# Patient Record
Sex: FEMALE | Race: BLACK OR AFRICAN AMERICAN | NOT HISPANIC OR LATINO | URBAN - METROPOLITAN AREA
[De-identification: names, ages, dates, MRNs, and addresses within clinical notes are randomized per-mention and may not be internally consistent; named-entity substitution may affect disease eponyms.]

---

## 2018-11-06 ENCOUNTER — RECORDS - HEALTHEAST (OUTPATIENT)
Dept: LAB | Facility: CLINIC | Age: 28
End: 2018-11-06

## 2018-11-06 LAB
C REACTIVE PROTEIN LHE: 1.1 MG/DL (ref 0–0.8)
ERYTHROCYTE [DISTWIDTH] IN BLOOD BY AUTOMATED COUNT: 14 % (ref 11–14.5)
ERYTHROCYTE [SEDIMENTATION RATE] IN BLOOD BY WESTERGREN METHOD: 12 MM/HR (ref 0–20)
HCT VFR BLD AUTO: 39.3 % (ref 35–47)
HGB BLD-MCNC: 12.3 G/DL (ref 12–16)
MCH RBC QN AUTO: 25.5 PG (ref 27–34)
MCHC RBC AUTO-ENTMCNC: 31.3 G/DL (ref 32–36)
MCV RBC AUTO: 81 FL (ref 80–100)
PLATELET # BLD AUTO: 313 THOU/UL (ref 140–440)
PMV BLD AUTO: 10.7 FL (ref 8.5–12.5)
RBC # BLD AUTO: 4.83 MILL/UL (ref 3.8–5.4)
WBC: 6 THOU/UL (ref 4–11)

## 2018-11-07 ENCOUNTER — HOSPITAL ENCOUNTER (OUTPATIENT)
Dept: ULTRASOUND IMAGING | Facility: CLINIC | Age: 28
Discharge: HOME | End: 2018-11-07

## 2018-11-07 ENCOUNTER — OFFICE VISIT - HEALTHEAST (OUTPATIENT)
Dept: FAMILY MEDICINE | Facility: CLINIC | Age: 28
End: 2018-11-07

## 2018-11-07 DIAGNOSIS — M79.89 LEFT LEG SWELLING: ICD-10-CM

## 2018-11-07 DIAGNOSIS — L03.116 CELLULITIS OF LEFT ANKLE: ICD-10-CM

## 2018-11-14 ENCOUNTER — RECORDS - HEALTHEAST (OUTPATIENT)
Dept: ADMINISTRATIVE | Facility: OTHER | Age: 28
End: 2018-11-14

## 2018-11-29 ENCOUNTER — HOSPITAL ENCOUNTER (OUTPATIENT)
Dept: MRI IMAGING | Facility: CLINIC | Age: 28
Discharge: HOME | End: 2018-11-29
Attending: PHYSICIAN ASSISTANT

## 2018-11-29 DIAGNOSIS — M25.579 ANKLE PAIN: ICD-10-CM

## 2018-12-03 ENCOUNTER — HOSPITAL ENCOUNTER (OUTPATIENT)
Dept: MRI IMAGING | Facility: CLINIC | Age: 28
Discharge: HOME | End: 2018-12-03
Attending: PHYSICIAN ASSISTANT

## 2018-12-03 DIAGNOSIS — M25.579 ANKLE PAIN: ICD-10-CM

## 2018-12-12 ENCOUNTER — OFFICE VISIT - HEALTHEAST (OUTPATIENT)
Dept: FAMILY MEDICINE | Facility: CLINIC | Age: 28
End: 2018-12-12

## 2018-12-12 DIAGNOSIS — R23.9 UNSPECIFIED SKIN CHANGES: ICD-10-CM

## 2018-12-12 DIAGNOSIS — Z83.3 FAMILY HISTORY OF DIABETES MELLITUS: ICD-10-CM

## 2018-12-12 LAB
ALBUMIN SERPL-MCNC: 3.9 G/DL (ref 3.5–5)
ALBUMIN UR-MCNC: NEGATIVE MG/DL
ALP SERPL-CCNC: 86 U/L (ref 45–120)
ALT SERPL W P-5'-P-CCNC: 20 U/L (ref 0–45)
ANION GAP SERPL CALCULATED.3IONS-SCNC: 9 MMOL/L (ref 5–18)
APPEARANCE UR: CLEAR
AST SERPL W P-5'-P-CCNC: 20 U/L (ref 0–40)
BACTERIA #/AREA URNS HPF: ABNORMAL HPF
BASOPHILS # BLD AUTO: 0 THOU/UL (ref 0–0.2)
BASOPHILS NFR BLD AUTO: 0 % (ref 0–2)
BILIRUB SERPL-MCNC: 0.5 MG/DL (ref 0–1)
BILIRUB UR QL STRIP: NEGATIVE
BUN SERPL-MCNC: 9 MG/DL (ref 8–22)
CALCIUM SERPL-MCNC: 9.1 MG/DL (ref 8.5–10.5)
CHLORIDE BLD-SCNC: 106 MMOL/L (ref 98–107)
CO2 SERPL-SCNC: 24 MMOL/L (ref 22–31)
COLOR UR AUTO: YELLOW
CREAT SERPL-MCNC: 0.68 MG/DL (ref 0.6–1.1)
EOSINOPHIL # BLD AUTO: 0.1 THOU/UL (ref 0–0.4)
EOSINOPHIL NFR BLD AUTO: 2 % (ref 0–6)
ERYTHROCYTE [DISTWIDTH] IN BLOOD BY AUTOMATED COUNT: 13 % (ref 11–14.5)
GFR SERPL CREATININE-BSD FRML MDRD: >60 ML/MIN/1.73M2
GLUCOSE BLD-MCNC: 95 MG/DL (ref 70–125)
GLUCOSE UR STRIP-MCNC: NEGATIVE MG/DL
HBA1C MFR BLD: 5.6 % (ref 3.5–6)
HCT VFR BLD AUTO: 36.8 % (ref 35–47)
HGB BLD-MCNC: 12 G/DL (ref 12–16)
HGB UR QL STRIP: NEGATIVE
KETONES UR STRIP-MCNC: NEGATIVE MG/DL
LEUKOCYTE ESTERASE UR QL STRIP: NEGATIVE
LYMPHOCYTES # BLD AUTO: 2.5 THOU/UL (ref 0.8–4.4)
LYMPHOCYTES NFR BLD AUTO: 46 % (ref 20–40)
MCH RBC QN AUTO: 25.3 PG (ref 27–34)
MCHC RBC AUTO-ENTMCNC: 32.5 G/DL (ref 32–36)
MCV RBC AUTO: 78 FL (ref 80–100)
MONOCYTES # BLD AUTO: 0.4 THOU/UL (ref 0–0.9)
MONOCYTES NFR BLD AUTO: 7 % (ref 2–10)
NEUTROPHILS # BLD AUTO: 2.4 THOU/UL (ref 2–7.7)
NEUTROPHILS NFR BLD AUTO: 45 % (ref 50–70)
NITRATE UR QL: NEGATIVE
PH UR STRIP: 6 [PH] (ref 5–8)
PLATELET # BLD AUTO: 280 THOU/UL (ref 140–440)
PMV BLD AUTO: 8.3 FL (ref 7–10)
POTASSIUM BLD-SCNC: 3.6 MMOL/L (ref 3.5–5)
PROT SERPL-MCNC: 7.6 G/DL (ref 6–8)
RBC # BLD AUTO: 4.74 MILL/UL (ref 3.8–5.4)
RBC #/AREA URNS AUTO: ABNORMAL HPF
SODIUM SERPL-SCNC: 139 MMOL/L (ref 136–145)
SP GR UR STRIP: >=1.03 (ref 1–1.03)
SQUAMOUS #/AREA URNS AUTO: ABNORMAL LPF
UROBILINOGEN UR STRIP-ACNC: ABNORMAL
WBC #/AREA URNS AUTO: ABNORMAL HPF
WBC: 5.3 THOU/UL (ref 4–11)

## 2018-12-12 RX ORDER — DESOXIMETASONE 0.5 MG/G
CREAM TOPICAL
Qty: 30 G | Refills: 2 | Status: SHIPPED | OUTPATIENT
Start: 2018-12-12

## 2019-01-26 ENCOUNTER — HOSPITAL ENCOUNTER (OUTPATIENT)
Dept: ULTRASOUND IMAGING | Facility: HOSPITAL | Age: 29
Discharge: HOME | End: 2019-01-26

## 2019-01-26 ENCOUNTER — OFFICE VISIT - HEALTHEAST (OUTPATIENT)
Dept: FAMILY MEDICINE | Facility: CLINIC | Age: 29
End: 2019-01-26

## 2019-01-26 DIAGNOSIS — R10.2 PELVIC PAIN IN FEMALE: ICD-10-CM

## 2019-01-26 DIAGNOSIS — M54.50 ACUTE MIDLINE LOW BACK PAIN WITHOUT SCIATICA: ICD-10-CM

## 2019-01-26 RX ORDER — CYCLOBENZAPRINE HCL 10 MG
10 TABLET ORAL 3 TIMES DAILY PRN
Qty: 30 TABLET | Refills: 0 | Status: SHIPPED | OUTPATIENT
Start: 2019-01-26

## 2019-02-15 ENCOUNTER — COMMUNICATION - HEALTHEAST (OUTPATIENT)
Dept: ADMINISTRATIVE | Facility: CLINIC | Age: 29
End: 2019-02-15

## 2019-02-15 ENCOUNTER — AMBULATORY - HEALTHEAST (OUTPATIENT)
Dept: FAMILY MEDICINE | Facility: CLINIC | Age: 29
End: 2019-02-15

## 2019-02-15 DIAGNOSIS — E28.2 PCOS (POLYCYSTIC OVARIAN SYNDROME): ICD-10-CM

## 2019-02-22 ENCOUNTER — OFFICE VISIT - HEALTHEAST (OUTPATIENT)
Dept: ENDOCRINOLOGY | Facility: CLINIC | Age: 29
End: 2019-02-22

## 2019-02-22 ENCOUNTER — COMMUNICATION - HEALTHEAST (OUTPATIENT)
Dept: ENDOCRINOLOGY | Facility: CLINIC | Age: 29
End: 2019-02-22

## 2019-02-22 DIAGNOSIS — L83 ACANTHOSIS NIGRICANS: ICD-10-CM

## 2019-02-22 DIAGNOSIS — N94.6 DYSMENORRHEA: ICD-10-CM

## 2019-02-26 ENCOUNTER — COMMUNICATION - HEALTHEAST (OUTPATIENT)
Dept: TELEHEALTH | Facility: CLINIC | Age: 29
End: 2019-02-26

## 2019-03-11 ENCOUNTER — COMMUNICATION - HEALTHEAST (OUTPATIENT)
Dept: ENDOCRINOLOGY | Facility: CLINIC | Age: 29
End: 2019-03-11

## 2019-03-11 DIAGNOSIS — L83 ACANTHOSIS NIGRICANS: ICD-10-CM

## 2019-03-11 RX ORDER — METFORMIN HCL 500 MG
TABLET, EXTENDED RELEASE 24 HR ORAL
Qty: 60 TABLET | Refills: 5 | Status: SHIPPED | OUTPATIENT
Start: 2019-03-11

## 2019-03-25 ENCOUNTER — COMMUNICATION - HEALTHEAST (OUTPATIENT)
Dept: FAMILY MEDICINE | Facility: CLINIC | Age: 29
End: 2019-03-25

## 2019-03-25 DIAGNOSIS — B96.89 BV (BACTERIAL VAGINOSIS): ICD-10-CM

## 2019-03-25 DIAGNOSIS — N76.0 BV (BACTERIAL VAGINOSIS): ICD-10-CM

## 2019-08-02 ENCOUNTER — COMMUNICATION - HEALTHEAST (OUTPATIENT)
Dept: FAMILY MEDICINE | Facility: CLINIC | Age: 29
End: 2019-08-02

## 2019-08-02 DIAGNOSIS — R39.9 UTI SYMPTOMS: ICD-10-CM

## 2021-05-27 NOTE — TELEPHONE ENCOUNTER
Mayte called to report vaginal discharge, itching and odor consistent with previous episodes of BV.  Not sexually active, no concern for STI.  Metronidazole 500mg two times a day x 7 days, rx sent to pharmacy. Counseled to avoid alcohol while taking and for one week after.

## 2021-05-31 NOTE — TELEPHONE ENCOUNTER
Received call that patient has developed increase in urinary frequency and dysuria over the past 2 days.  No hematuria, no abdominal, back or flank pain.  No fevers, nausea or vomiting.  Rx sent to pharmacy.

## 2021-06-02 VITALS — WEIGHT: 264 LBS

## 2021-06-02 VITALS — WEIGHT: 256 LBS

## 2021-06-02 VITALS — WEIGHT: 267.4 LBS

## 2021-06-16 PROBLEM — L83 ACANTHOSIS NIGRICANS: Status: ACTIVE | Noted: 2019-02-22

## 2021-06-16 PROBLEM — R87.619 ABNORMAL PAP SMEAR OF CERVIX: Status: ACTIVE | Noted: 2019-01-03

## 2021-06-16 PROBLEM — N94.6 DYSMENORRHEA: Status: ACTIVE | Noted: 2019-02-22

## 2021-06-20 ENCOUNTER — HEALTH MAINTENANCE LETTER (OUTPATIENT)
Age: 31
End: 2021-06-20

## 2021-06-21 NOTE — PROGRESS NOTES
WALK IN CARE - VISIT NOTE    HPI    26 yo female presenting for evaluation of:    1. Left leg swelling  - has been going on for 1 week  - pain/swelling originated in ankle but the pain has now spread upwards from her ankle and swelling has expanding  - pain:   - soreness, occasional stabbing sensation   - 4-5/10 at baseline but can be at 7-8/10  - no recent trauma, no changes in level of activity  - no fevers/chills/sweats  - no history of smoking, not on any OCP  - patient states she has noticed some thick fluid on her left socks occasionally over the last 1 week     ROS  Negative except as noted in HPI    OBJECTIVE    Vitals  Vitals:    11/07/18 0943   BP: 120/80   Pulse: 80   Resp: 17   Temp: 97.4  F (36.3  C)   SpO2: 98%     Physical Exam  General: No acute distress  CV: RRR, distal and peripheral pulses in tact  Resp: CTA bilaterally, no wheezing, no rhonchi, no rhales, no respiratory distress  Extrem: redness and TTP overlying lateral component of left ankle, do see focus of erythema from where her redness seems to be expanding which is warm to touch, but I do not feel a pocket of fluid that would be amenable to aspiration/I&D, does have pitting edema in LLE up to the ankle; does have calf tenderness with flexion of ankle, does have calf tenderness to palpation; left calf circumference 43.5 cm, right calf circumference 43 cm; has a scab on her heel and has significantly dry/cracked skin on bilateral ankles, left worse than right     Labs  NA - no labs today       ASSESSMENT/PLAN      # Left leg swelling  - well's score 2  - will order US DVT  - given her physical exam, I suspect infectious etiology  - will order US DVT to rule out clot  - if negative, start bactrim DS two times a day x7 days      UPDATE  US negative for DVT. Patient advised over the phone. Will start bactrim two times a day x7 days

## 2021-06-23 NOTE — PROGRESS NOTES
ASSESSMENT:   1. Pelvic pain in female  US Pelvis With Transvaginal Non OB   2. Acute midline low back pain without sciatica  cyclobenzaprine (FLEXERIL) 10 MG tablet       PLAN:  Spoke with patient's GYN provider, Dr. Wakefield, who recommends transvaginal ultrasound to rule out malpositioned IUD.  This was completed, and found to be in the expected position.  Pain likely secondary to new IUD, patient is reassured.  Asked to continue NSAIDs, prescribed a small number of Flexeril for pain management.  Will follow up with GYN as previously scheduled.    I discussed red flag symptoms, return precautions to clinic/ER and follow up care with patient/parent.  Expected clinical course, symptomatic cares advised. Questions answered. Patient/parent amenable with plan.    Patient Instructions:  Patient Instructions   Follow up with Dr. Wakefield as previously discussed.      SUBJECTIVE:   Mayte Peacock is a 28 y.o. female who presents today with low back pain for the past 2 days. No radiation of pain, paresthesias, saddle anesthesia, loss of bowel or bladder control, lower extremity weakness. Of note, patient had an IUD placed 2 days ago, symptoms onset shortly after insertion. Patient states she is unable to feel the IUD strings, however does note that she could not feel them following insertion either. No abdominal pain or vaginal bleeding/discharge. No fevers. Taking ibuprofen with moderate pain relief.      ROS:  Comprehensive 12 pt ROS completed, positives noted in HPI, otherwise negative.      Past Medical History:  Patient Active Problem List   Diagnosis     Abnormal Pap smear of cervix       Surgical History:  No past surgical history on file.        Family History:  No family history on file.    Reviewed; Non-contributory    Social History     Tobacco Use   Smoking Status Never Smoker   Smokeless Tobacco Never Used         Current Medications:  Current Outpatient Medications on File Prior to Visit   Medication Sig Dispense  Refill     levonorgestrel (MIRENA) 20 mcg/24 hr (5 years) IUD 1 Device by Intrauterine route.       desoximetasone (TOPICORT) 0.05 % cream Apply to affected area twice daily. 30 g 2     No current facility-administered medications on file prior to visit.        Allergies:   No Known Allergies    OBJECTIVE:   Vitals:    01/26/19 1425   BP: 122/64   Pulse: 78   Resp: 18   Temp: 98.1  F (36.7  C)   TempSrc: Oral   SpO2: 98%     Physical exam reveals a pleasant 28 y.o. female.   Appears healthy, alert and cooperative. Non-toxic appearance.  Lungs: Chest is clear, no wheezing, rhonchi or rales. Symmetric air entry throughout both lung fields.  Heart: regular rate and rhythm, no murmur, rub or gallop  Abdomen: soft, nontender. No masses or organomegaly  Back: No TTP paraspinal musculature or spinous processes, normal lower extremity strength, sensation.  Skin: pink, warm, dry, and without lesions on limited skin exam. No rashes.       RADIOLOGY    Us Pelvis With Transvaginal Non Ob    Result Date: 1/26/2019  US PELVIS WITH TRANSVAGINAL NON OB 1/26/2019 5:05 PM INDICATION: New intrauterine device with pain. TECHNIQUE: Transabdominal scans were performed. Endovaginal ultrasound was performed to better visualize the adnexa. COMPARISON: None. FINDINGS: Uterus measures 6.2 x 3.2 x 3.6 cm. Normal uterus with no masses. Endometrial thickness is 10 mm. Normal-appearing endometrium with intrauterine device in expected position.  Right ovary measures 3.8 x 1.3 x 2.4 cm. Normal right ovary. Normal arterial duplex. Left ovary measures 4.5 x 2.4 x 1.9 cm. Normal left ovary. Normal arterial duplex. No significant free fluid in the cul-de-sac.     CONCLUSION: 1.  Intrauterine device in expected position. 2.  No significant abnormal findings. 3.  We will call this report per request.      LABORATORY STUDIES    none      Tila Bass, CNP

## 2021-06-24 NOTE — TELEPHONE ENCOUNTER
Patient called. Would like to see Jacki in [facility].    Jacki are you ok for doing consult? Should we put her in sooner? Or ok for 1st available?

## 2021-06-24 NOTE — TELEPHONE ENCOUNTER
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  Thanks!

## 2021-06-24 NOTE — PROGRESS NOTES
[facility]  ENDOCRINOLOGY    Endocrine Note 2/26/2019    Mayte Peacock, 1990, [id number]          Reason for visit      1. Dysmenorrhea    2. Acanthosis nigricans        History     Mayte Peacock is a very pleasant 28 y.o. old female who presents for follow up.  SUMMARY:  Mayte is here today to establish care. She has been worked up for PCOS with only slight deviations in normal levels. She reports very heavy menses, in spite of having a Mirena in place.  Before the placement of the Mirena, she hadn't had a period in 2 years.  She states that she was regular until she had a Depo Provera injection, after which she bled for 6 months.  She states that she got very anemic, and that since then, she stays very cold. Her current A1c is 5.5, and normal.  She does have significant acanthosis in her nuchal fold.     Past Medical History     Patient Active Problem List   Diagnosis     Abnormal Pap smear of cervix     Dysmenorrhea     Acanthosis nigricans       Family History       family history is not on file.    Social History      reports that  has never smoked. she has never used smokeless tobacco.      Review of Systems     Patient has no polyuria or polydipsia, no chest pain, dyspnea or TIA's, no numbness, tingling or pain in extremities  Remainder negative except as noted in HPI.      Vital Signs     /76 (Patient Site: Right Arm, Patient Position: Sitting, Cuff Size: Adult Regular)   Pulse 78   Wt (!) 267 lb 6.4 oz (121.3 kg)   Wt Readings from Last 3 Encounters:   02/22/19 (!) 267 lb 6.4 oz (121.3 kg)   12/12/18 (!) 256 lb (116.1 kg)   11/07/18 (!) 264 lb (119.7 kg)       Physical Exam     GENERAL:  Normal, NIRD,obese  EYES:  Pupils equal, round and reactive to light; no proptosis, lid lag or  periorbital edema.  THYROID:  Thyroid is normal.  No tenderness or bruit  NECK: No lymph nodes  MUSCULOSKELETAL:  Muscle strength grossly normal without evidence of wasting.  HEART:  Regular rate and rhythm  without murmur.  LUNGS:  Clear to auscultation.  ABDOMEN: Soft, non-tender, no masses or organomegaly  NEURO:  Patella Reflexes were normal.No tremors  SKIN:  mild acanthosis nigricans in nuchal fold, no vitiligo        Assessment     1. Dysmenorrhea    2. Acanthosis nigricans        Plan     We will get some Metformin started at 500 mg two times a day and see if it helps with her menses, as well as the acanthosis.  She will f/u with me in 3 months.     Total Time:30  Time spent in counseling and coordination of care:25  Nano Hodgson   Endocrinology  2/26/2019  8:38 AM      Lab Results   Lab Results: personally reviewed.   Office Visit on 12/12/2018   Component Date Value     Color, UA 12/12/2018 Yellow      Clarity, UA 12/12/2018 Clear      Glucose, UA 12/12/2018 Negative      Bilirubin, UA 12/12/2018 Negative      Ketones, UA 12/12/2018 Negative      Specific Gravity, UA 12/12/2018 >=1.030      Blood, UA 12/12/2018 Negative      pH, UA 12/12/2018 6.0      Protein, UA 12/12/2018 Negative      Urobilinogen, UA 12/12/2018 0.2 E.U./dL      Nitrite, UA 12/12/2018 Negative      Leukocytes, UA 12/12/2018 Negative      Bacteria, UA 12/12/2018 Few*     RBC, UA 12/12/2018 None Seen      WBC, UA 12/12/2018 0-5      Squam Epithel, UA 12/12/2018 10-25*     Sodium 12/12/2018 139      Potassium 12/12/2018 3.6      Chloride 12/12/2018 106      CO2 12/12/2018 24      Anion Gap, Calculation 12/12/2018 9      Glucose 12/12/2018 95      BUN 12/12/2018 9      Creatinine 12/12/2018 0.68      GFR MDRD Af Amer 12/12/2018 >60      GFR MDRD Non Af Amer 12/12/2018 >60      Bilirubin, Total 12/12/2018 0.5      Calcium 12/12/2018 9.1      Protein, Total 12/12/2018 7.6      Albumin 12/12/2018 3.9      Alkaline Phosphatase 12/12/2018 86      AST 12/12/2018 20      ALT 12/12/2018 20      Hemoglobin A1c 12/12/2018 5.6      WBC 12/12/2018 5.3      RBC 12/12/2018 4.74      Hemoglobin 12/12/2018 12.0      Hematocrit 12/12/2018 36.8      MCV  12/12/2018 78*     MCH 12/12/2018 25.3*     MCHC 12/12/2018 32.5      RDW 12/12/2018 13.0      Platelets 12/12/2018 280      MPV 12/12/2018 8.3      Neutrophils % 12/12/2018 45*     Lymphocytes % 12/12/2018 46*     Monocytes % 12/12/2018 7      Eosinophils % 12/12/2018 2      Basophils % 12/12/2018 0      Neutrophils Absolute 12/12/2018 2.4      Lymphocytes Absolute 12/12/2018 2.5      Monocytes Absolute 12/12/2018 0.4      Eosinophils Absolute 12/12/2018 0.1      Basophils Absolute 12/12/2018 0.0    Lab Requisition on 11/06/2018   Component Date Value     WBC 11/06/2018 6.0      RBC 11/06/2018 4.83      Hemoglobin 11/06/2018 12.3      Hematocrit 11/06/2018 39.3      MCV 11/06/2018 81      MCH 11/06/2018 25.5*     MCHC 11/06/2018 31.3*     RDW 11/06/2018 14.0      Platelets 11/06/2018 313      MPV 11/06/2018 10.7      Sed Rate 11/06/2018 12      CRP 11/06/2018 1.1*       Fingerstick Blood Glucose: No results for input(s): POCGLUFGR in the last 48 hours.    Last Hbg A1C:   Lab Results   Component Value Date    HGBA1C 5.6 12/12/2018      No results for input(s): TSH, FREET4, E6LNNYH, E1KMJTG, THYRO, THYROIDAB, CORTISO, IGF1I, PROLACTIN in the last 72 hours.  No results for input(s): CORTPRE, VZXX18HMG, FRXT83YRO in the last 72 hours.        Invalid input(s): LABALBU        No results found for: TESTOSTERONE  Untested Testosterone, Free  No results found for: LABTEST    Current Medications     Outpatient Medications Prior to Visit   Medication Sig Dispense Refill     cyclobenzaprine (FLEXERIL) 10 MG tablet Take 1 tablet (10 mg total) by mouth 3 (three) times a day as needed for muscle spasms. 30 tablet 0     desoximetasone (TOPICORT) 0.05 % cream Apply to affected area twice daily. 30 g 2     levonorgestrel (MIRENA) 20 mcg/24 hr (5 years) IUD 1 Device by Intrauterine route.       No facility-administered medications prior to visit.

## 2021-06-24 NOTE — TELEPHONE ENCOUNTER
Jose    Review and advise on scheduling. If ok for first available or if should be seen sooner. Referring is asking for urgent.     Auth Provider: LOBITO GRACE Enc Provider: Lobito Grace CNP    Diagnosis: PCOS (polycystic ovarian syndrome)      Indications of Use:         Expected Date:        Order Questions:          Order Comments: Hx of PCOS, non insulin resistant      Sched Instructions:   Patient Instructions:       Visit Types: CONSULT  NEW PATIENT      Sched Instruct (Non-Radiology):         Referral Priority: Urgent [2]

## 2021-06-24 NOTE — TELEPHONE ENCOUNTER
Mayte phoned to tell me that she is having significant GI side effects from the Metformin - nausea and diarrhea.  She is only able to take 1 tablet daily. I will order the enteric formula for her to see if we can circumvent the side effects.

## 2021-10-11 ENCOUNTER — HEALTH MAINTENANCE LETTER (OUTPATIENT)
Age: 31
End: 2021-10-11

## 2022-07-17 ENCOUNTER — HEALTH MAINTENANCE LETTER (OUTPATIENT)
Age: 32
End: 2022-07-17

## 2022-09-25 ENCOUNTER — HEALTH MAINTENANCE LETTER (OUTPATIENT)
Age: 32
End: 2022-09-25

## 2023-08-05 ENCOUNTER — HEALTH MAINTENANCE LETTER (OUTPATIENT)
Age: 33
End: 2023-08-05